# Patient Record
(demographics unavailable — no encounter records)

---

## 2024-12-12 NOTE — HISTORY OF PRESENT ILLNESS
[FreeTextEntry1] : Mr. BETANCUR is a 84 year old male referred by [facility] who presents for rib fractures following a fall on 11/27/24 as well as a spiculated nodule noted incidentally on the scans.  His past medical history includes squamous cell carcinoma of the tongue, type 2 diabetes mellitus, hypertension  CT Chest 11/27/24 at [facility]  - Lung nodule left upper lobe - Nondisplaced right 5th and 6th rib fractures  He was told following the Emergency Department visit to come here. He continues to have right rib pain both in the front and back. He also feels yesterday it was a little swollen. He has the oxycodone for the pain but hasn't been taking it due to dizziness and it doesn't help with the pain. He has trouble sleeping at night due to the pain. He was told that there may be another finding on the scan. He does have a little shortness of breath and coughing which is worse in the morning. He feels that his mouth is dry from the radiation.   The patient is accompanied by his wife, who assists with history taking.

## 2024-12-12 NOTE — DATA REVIEWED
[FreeTextEntry1] : Independent review of the following imaging and independent interpretation was performed at today's visit: - CT Chest 11/27 at [facility] Emergency Department  - Chest X-ray 11/27/24 at [facility] Emergency Department

## 2024-12-12 NOTE — DATA REVIEWED
[FreeTextEntry1] : Independent review of the following imaging and independent interpretation was performed at today's visit: - CT Chest 11/27 at [facility] Emergency Department  - Chest X-ray 11/27/24 at [facility] Emergency Department

## 2024-12-12 NOTE — DATA REVIEWED
[FreeTextEntry1] : Independent review of the following imaging and independent interpretation was performed at today's visit: - CT Chest 11/27 at [facility] Emergency Department  - Chest X-ray 11/27/24 at [facility] Emergency Department

## 2024-12-12 NOTE — ASSESSMENT
[FreeTextEntry1] : I had the pleasure of evaluating Mr. DIANE BETANCUR today. Briefly, this is a 84 year old male who has been found to have a 1.4 cm spiculated lung nodule, incidentally noted on recent CT scan of the chest. This is a new patient to my practice and I am glad for the opportunity to monitor the nodules to mitigate the risk for advancing disease.  In addition, Mr. Betancur was noted to have rib fractures following recent fall in a parking lot with loss of consciousness. He reports frequent weakness and dizziness. Today his blood pressure in 95/63 while on lisinopril and alpha blockers, which may have contributed to his symptoms and the syncopal episode. Patient encouraged to follow up with his primary care physician for re-evaluation of his medication.  The rib fractures were noted and are nondisplaced. He was encouraged to continue pain medication as needed and follow up for assurance of adequate healing.   After reviewing the imaging, it was also determined that there is a nodule present in the apex of the left lung. We discussed that nodules can be of infectious, inflammatory or malignant etiologies. When evaluating suspicious nodules, we look at certain characteristics of nodules such as size, consistency, shape and rate of growth. While this nodule is irregularly shaped, the risk of a malignancy is unclear as he has had PET scans and other CT scans in the past that have not definitely shown activity in that area.   Also taking into consideration Mr. Betancur's current pain issues while the rib fracture heals, his age and comorbid conditions, I believe that ongoing surveillance is appropriate at this time.    Moving forward our plan is to obtain a follow up surveillance CT chest in 3 months and at that time Mr. BETANCUR will return to care in my office to discuss the findings. All questions were answered, concerns were addressed and the patient expressed understanding and compliance with the followup plan.   Summary of plan: - CT chest in 3 months to evaluate both the lung nodule for growth and the rib fractures for signs of healing - Return to care after testing to review results   I, Dr. Orion Mir, personally performed the evaluation and management (E/M) services for this new patient.  That E/M includes conducting the initial examination, assessing all conditions, and establishing the plan of care.  Today, Tristin Turpin PA-C, was here to observe my evaluation and management services for this patient to be followed going forward.

## 2024-12-12 NOTE — REVIEW OF SYSTEMS
[Feeling Tired] : feeling tired [Cough] : cough [SOB on Exertion] : shortness of breath during exertion [Negative] : Heme/Lymph [Fever] : no fever [Chills] : no chills [FreeTextEntry8] : enlarged prostate

## 2024-12-12 NOTE — PHYSICAL EXAM
[General Appearance - Alert] : alert [General Appearance - In No Acute Distress] : in no acute distress [General Appearance - Well Nourished] : well nourished [General Appearance - Well Developed] : well developed [Sclera] : the sclera and conjunctiva were normal [Outer Ear] : the ears and nose were normal in appearance [Both Tympanic Membranes Were Examined] : both tympanic membranes were normal [Neck Appearance] : the appearance of the neck was normal [] : the neck was supple [Respiration, Rhythm And Depth] : normal respiratory rhythm and effort [Auscultation Breath Sounds / Voice Sounds] : lungs were clear to auscultation bilaterally [Heart Sounds] : normal S1 and S2 [Bowel Sounds] : normal bowel sounds [Cervical Lymph Nodes Enlarged Posterior Bilaterally] : posterior cervical [Cervical Lymph Nodes Enlarged Anterior Bilaterally] : anterior cervical [Supraclavicular Lymph Nodes Enlarged Bilaterally] : supraclavicular [Abnormal Walk] : normal gait [Skin Color & Pigmentation] : normal skin color and pigmentation [Skin Turgor] : normal skin turgor [No Focal Deficits] : no focal deficits [Oriented To Time, Place, And Person] : oriented to person, place, and time [Impaired Insight] : insight and judgment were intact [FreeTextEntry1] : right anterior rib below nipple with point tenderness. Upper right quadrant/lower chest anteriorly about 3 inches below broken rib area with swelling as well [FreeTextEntry2] : no edmea

## 2024-12-12 NOTE — HISTORY OF PRESENT ILLNESS
[FreeTextEntry1] : Mr. BETANCUR is a 84 year old male referred by [facility] who presents for rib fractures following a fall on 11/27/24 as well as a spiculated nodule noted incidentally on the scans.  His past medical history includes squamous cell carcinoma of the tongue, type 2 diabetes mellitus, hypertension  CT Chest 11/27/24 at [facility]  - Lung nodule left upper lobe - Nondisplaced right 5th and 6th rib fractures  He was told following the Emergency Department visit to come here. He continues to have right rib pain both in the front and back. He also feels yesterday it was a little swollen. He has the oxycodone for the pain but hasn't been taking it due to dizziness and it doesn't help with the pain. He has trouble sleeping at night due to the pain. He was told that there may be another finding on the scan. He does have a little shortness of breath and coughing which is worse in the morning. He feels that his mouth is dry from the radiation.   The patient is accompanied by his wife, who assists with history taking.

## 2024-12-12 NOTE — HISTORY OF PRESENT ILLNESS
[FreeTextEntry1] : Mr. BETANCUR is a 84 year old male referred by [facility] who presents for rib fractures following a fall on 11/27/24 as well as a spiculated nodule noted incidentally on the scans.  His past medical history includes squamous cell carcinoma of the tongue, type 2 diabetes mellitus, hypertension  CT Chest 11/27/24 at [facility]  - Lung nodule left upper lobe - Nondisplaced right 5th and 6th rib fractures  He was told following the Emergency Department visit to come here. He continues to have right rib pain both in the front and back. He also feels yesterday it was a little swollen. He has the oxycodone for the pain but hasn't been taking it due to dizziness and it doesn't help with the pain. He has trouble sleeping at night due to the pain. He was told that there may be another finding on the scan. He does have a little shortness of breath and coughing which is worse in the morning. He feels that his mouth is dry from the radiation.   The patient is accompanied by his wife, who assists with history taking.

## 2024-12-19 NOTE — RESULTS/DATA
[FreeTextEntry1] : 08/29/2024 - CT Neck Soft Tissue FINDINGS:  - Posttreatment changes are seen throughout the aerodigestive tract comprising of soft tissue swelling, edema, and loss of soft tissue fat planes. No masslike or nodular enhancement is seen in or about the oropharynx to suggest the presence of residual or recurrent tumor. - No new or enlarging cervical lymph nodes are seen in comparison with the prior neck CT study. - The salivary glands appear unremarkable apart from posttreatment changes. - The thyroid gland appears unremarkable. - Arterial vascular calcifications are seen. The neck vessels are patent. - No acute intracranial or orbital abnormalities are imaged. - The paranasal sinuses and tympanomastoid cavities are clear. - The patient is edentulous. The maxilla, mandible, and zygomatic arches are intact. The TMJ spaces appear within normal limits. - Mild multilevel cervical spondylosis is noted. There is an accessory right-sided C7 rib. - The imaged portions of the lungs are clear. Please refer to the report for the concurrent dedicated chest CT for findings regarding the thoracic structures. IMPRESSION:  - Stable interval follow-up CT examination without evidence of neoplastic disease progression nor recurrence. No new or enlarging cervical lymph nodes.

## 2024-12-19 NOTE — PHYSICAL EXAM
[Restricted in physically strenuous activity but ambulatory and able to carry out work of a light or sedentary nature] : Status 1- Restricted in physically strenuous activity but ambulatory and able to carry out work of a light or sedentary nature, e.g., light house work, office work [Normal] : affect appropriate [de-identified] : trach in place

## 2024-12-19 NOTE — HISTORY OF PRESENT ILLNESS
[de-identified] : 83 yo M with h/o HTN, DM2 and HLD who was diagnosed with SCC of the L BOT in May 2023.  He presented to ENT with dysphagia.  CT showed a 2.5 x 2.4 left cervical LN.  ENT exam showed a exophytic base of tongue mass extending along the left pharyngeal wall extending down to his vallecula crossing the midline and pushing his epiglottis.  Biopsies of both the LN and BOT mass were c/w SCC. p16+. Stage I, HPV SCC of tongue.   He completed CRT with cetuximab on 7/31/23. He comes today to transfer medical oncology care to [place], where his daughter lives.  He feels well overall with improving PO intake. can eat soft foods now, but no burgers, steaks, or hot dogs as yet. Denies any fever, chills, chest pain, SOB, nausea, vomiting, diarrhea, consitpation, dysuria, or hematuria.  11/09/23: Mr Mann returns for follow up. His PET scan was negative for recurrence/residual disease though official report is being faxed over for my verification. He will continue to follow up with ENT and scan will be under that perview. Follow up in 3 months. Currently BHUMI  02/15/24: Mr Mann returns for follow up. He is doing ok and saw Dr. Benji Roberts in [place] who performed layrngoscopy which was negative. Weight is down a little.  Will order CT neck and chest for end of March and follow up in 2 months to discuss the results  04/18/24: Mr Mann returns for follow up. CT neck from 03/29/24 shows posttreatment changes. No evidence of residual or recurrent left base of tongue neoplasm. No cervical lymphadenopathy.  Doing well overall, but still has difficulty swallowing solids. No issues with liquids. Will get feeding tube removed. FOllow up in 3 months  07/19/24: Mr. Mann returns for follow up. States he is feeling "lousy", endorsing he has not improved in any symptoms. He notes weight loss of approximately 20 lbs. since April 2024, due to difficulties with swallowing. States even when consuming thick liquids or soft solid foods (eggs, oatmeal, etc.) gets stuck in his throat, and requires water to push it down. Denies decreased appetite. Recently followed with ENT, who noted there is nothing they can do to help. He received a referral for another ENT in [place] for evaluation of esophageal dilation. Scheduled evaluation for some time in August 2024. Patient complains of a constant lump and visible bulge in his throat.  Additionally, patient complains that for the past 2-3 weeks, there are boils inside his lip. The boils come and go but notes there is a big one at time of visit.  Patient's spouse endorses a new onset unsteadiness when walking and standing up. Over the past few weeks his wife reports the patient has fallen on 3 occasions. Patient complains that he is a bit dazed. He endorses drinking plenty of water, especially because his throat dries out quickly, and does not believe dehydration is the cause. Currently on Lisinopril 5 MG 1x day.   [de-identified] : 09/17/2024: Mr. Mann presents for follow up. He states he recently followed up with ENT ([facility]), and he underwent esophageal dilation. Reports that they were able to dilate the esophagus slowly, however he was ultimately referred to GI for further dilation. He is still complaining of difficulty with swallowing / dysphagia. States that when he is speaking he experiences significant dryness of mouth, which exacerbates dysphagia and makes it hard to speak/swalllow. Inquired about imaging.   12/19/24: Patient presents for surveillance follow up for a p16+ Stage I, HPV SCC of the L BOT s/p CRT with cetuximab, completed on 7/31/23 + Dysphagia, improving but still needs liquid to help get food down. + Xerostomia. + Dysgeusia, describes as "no taste at all".  Denies any recent fevers or chills

## 2024-12-19 NOTE — ASSESSMENT
[Palliative Care Plan] : not applicable at this time [FreeTextEntry1] : DIANE BETANCUR is a 83 year old male presenting to the clinic today with His spouse. The patient is being evaluated for a Stage I, HPV+ SCC of the L BOT s/p CRT with cetuximab on -7/31/23    -Squmous cell carcinoma BOT: stage I HPV driven disease s/p CRT w cetux, doing well clinically. Will order PET scan for end of October to asses response to treatment. He will follow up to discuss the results.   11/09/23: Mr Betancur returns for follow up. His PET scan was negative for recurrence/residual disease though official report is being faxed over for my verification. He will continue to follow up with ENT and scan will be under that perview. Follow up in 3 months. Currently BHUMI  02/15/24: Mr Betancur returns for follow up. He is doing ok and saw Dr. Benji Roberts in [place] who performed layrngoscopy which was negative. Weight is down a little.  Will order CT neck and chest for end of March and follow up in 2 months to discuss the results  04/18/24: Mr Betancur returns for follow up. CT neck from 03/29/24 shows posttreatment changes. No evidence of residual or recurrent left base of tongue neoplasm. No cervical lymphadenopathy.  Doing well overall, but still has difficulty swallowing solids. No issues with liquids. Will get feeding tube removed. Follow up in 3 months  07/19/2024: Mr. Betancur presents for follow up. We reviewed the patient's symptoms in depth and discussed that the new onset dizziness and unsteadiness when standing are likely attributed to orthostatic hypotension. Advised patient to follow up with PCP and/or cardio for adjustment of Lisinopril dosage. Encouraged patient to follow with new ENT for possible proximal esophageal dilation to assist with patient's difficulty swallowing. Will obtain repeat CT of Neck/Head/Chest. Patient will RTO in September to review results.  09/17/2024: Mr. Betancur presents for follow up. Currently BHUMI. We reviewed the patient's symptoms in depth and discussed benefits of sooner follow up with GI. Will obtain repeat CT of Neck/Head/Chest. Patient will RTO in 3 months to review results.  12/19/24 Presents for surveillance follow up for a p16+ Stage I, HPV SCC of the L BOT s/p CRT with cetuximab, completed on 7/31/23. still dealing with significant post treatment sequela including dysphagia and severe dysgeusia.  EGD scheduled for next week, may need esophageal stent. Dr. Orion Mir following lung nodules, repeat CT in late February with Dr. Mir follow up on 3/13/24 Dr. Emanuel follow up in late March for routine surveillance or possibly to resume treatment pending further evaluation of lung nodules

## 2024-12-19 NOTE — REVIEW OF SYSTEMS
[Recent Change In Weight] : ~T recent weight change [Dysphagia] : dysphagia [Confused] : confusion [Dizziness] : dizziness [Fever] : no fever [Fatigue] : no fatigue [Vision Problems] : no vision problems [Hoarseness] : no hoarseness [Chest Pain] : no chest pain [Lower Ext Edema] : no lower extremity edema [Shortness Of Breath] : no shortness of breath [SOB on Exertion] : no shortness of breath during exertion [Abdominal Pain] : no abdominal pain [Vomiting] : no vomiting [Dysuria] : no dysuria [Joint Pain] : no joint pain [Easy Bleeding] : no tendency for easy bleeding [Easy Bruising] : no tendency for easy bruising [Swollen Glands] : no swollen glands [FreeTextEntry2] : unintentional weight loss 20+ lbs. [FreeTextEntry4] : inability to swallow solid foods. Bulge in throat

## 2025-03-04 NOTE — DATA REVIEWED
[FreeTextEntry1] : Independent review of imaging and independent interpretation was performed at today's visit. -2/26/25 CT Chest non contrast through [facility]

## 2025-03-04 NOTE — HISTORY OF PRESENT ILLNESS
[FreeTextEntry1] : Mr. BETANCUR is an 84-year-old male who presents for follow up visit regarding a lung nodule and rib fractures. At our last visit in December 2024, it was recommended that he obtain a CT Chest in 3 months which he presents today to review.   His past medical history includes squamous cell carcinoma of the tongue, type 2 diabetes mellitus, hypertension  11/27/24 CT Chest at [facility] - Stable spiculated density in the leg lung apex measuring 1.4cm, no significant change since prior CT Chest 8/29/24 from 1.2cm. - Nondisplaced right 5th and 6th rib fractures  2/26/25 CT Chest non contrast through [facility] -Mild bilateral upper lobe subpleural scarring/reticulations, likely related to post radiation change -Again, noted are 2 adjacent peribronchial vascular irregular solid nodules measuring 1.1 cm and 0.9 cm. These are without significant change from 8/29/2024. -Several additional pulmonary nodules are unchanged. 5mm nodule opacity left upper lobe apex, 2mm subpleural right apical nodule  1-2mm nodule left upper lobe and 2mm nodule superior segment left lower lobe.  -Healed/healing nondisplaced fractures of the right fourth through sixth posterolateral ribs

## 2025-03-04 NOTE — ASSESSMENT
[FreeTextEntry1] : Mr. BETANCUR is an 84-year-old male who presents for follow up visit regarding a lung nodule and rib fractures. At our last visit in December 2024, it was recommended that he obtain a CT Chest in 3 months which he presents today to review.   His past medical history includes squamous cell carcinoma of the tongue, type 2 diabetes mellitus, hypertension  2/26/25 CT Chest non contrast through [facility] -Mild bilateral upper lobe subpleural scarring/reticulations, likely related to post radiation change -Again, noted are 2 adjacent peribronchial vascular irregular solid nodules measuring 1.1 cm and 0.9 cm. These are without significant change from 8/29/2024. -Several additional pulmonary nodules are unchanged. 5mm nodule opacity left upper lobe apex, 2mm subpleural right apical nodule  1-2mm nodule left upper lobe and 2mm nodule superior segment left lower lobe.  -Healed/healing nondisplaced fractures of the right fourth through sixth posterolateral ribs  I have reviewed the patient's medical records and diagnostic images at time of this consultation and have made the following recommendations: 1. 2.  All questions answered, patient verbalizes understanding and agrees to follow up accordingly.

## 2025-03-13 NOTE — DATA REVIEWED
[FreeTextEntry1] : Independent review of imaging and independent interpretation was performed at today's visit. -2/26/25 CT Chest non contrast through [facility]

## 2025-03-13 NOTE — HISTORY OF PRESENT ILLNESS
[FreeTextEntry1] : Mr. BETANCUR is an 84-year-old male who presents for follow up visit regarding a lung nodule and rib fractures. At our last visit in December 2024, it was recommended that he obtain a CT Chest in 3 months which he presents today to review.   His past medical history includes squamous cell carcinoma of the tongue, type 2 diabetes mellitus, hypertension  11/27/24 CT Chest at [facility] - Stable spiculated density in the leg lung apex measuring 1.4cm, no significant change since prior CT Chest 8/29/24 from 1.2cm. - Nondisplaced right 5th and 6th rib fractures  2/26/25 CT Chest non contrast through [facility] -Mild bilateral upper lobe subpleural scarring/reticulations, likely related to post radiation change -Again, noted are 2 adjacent peribronchial vascular irregular solid nodules measuring 1.1 cm and 0.9 cm. These are without significant change from 8/29/2024. -Several additional pulmonary nodules are unchanged. 5mm nodule opacity left upper lobe apex, 2mm subpleural right apical nodule,1-2mm nodule left upper lobe and 2mm nodule superior segment left lower lobe.  -Healed/healing nondisplaced fractures of the right fourth through sixth posterolateral ribs  Today, he is accompanied by his wife who assists in the history taking. He does report shortness of breath on exertion, chronic nonproductive cough. He reports he is still experiencing discomfort on the right side where his initial rib fractures occurred. He states he also experiences this discomfort when he takes a deep breath.  Patient denies chest pain, palpitations, fevers, chills, fatigue, unintentional weight loss or gain, and night sweats.

## 2025-03-13 NOTE — DATA REVIEWED
[FreeTextEntry1] : Independent review of imaging and independent interpretation was performed at today's visit. -2/26/25 CT Chest non contrast through [facility]

## 2025-03-13 NOTE — PHYSICAL EXAM
[Sclera] : the sclera and conjunctiva were normal [Neck Appearance] : the appearance of the neck was normal [Respiration, Rhythm And Depth] : normal respiratory rhythm and effort [Auscultation Breath Sounds / Voice Sounds] : lungs were clear to auscultation bilaterally [Heart Rate And Rhythm] : heart rate was normal and rhythm regular [Heart Sounds] : normal S1 and S2 [Examination Of The Chest] : the chest was normal in appearance [No Focal Deficits] : no focal deficits [Skin Color & Pigmentation] : normal skin color and pigmentation [Oriented To Time, Place, And Person] : oriented to person, place, and time [Impaired Insight] : insight and judgment were intact [Affect] : the affect was normal [Mood] : the mood was normal [FreeTextEntry1] : ambulates with cane

## 2025-03-13 NOTE — PHYSICAL EXAM
[Sclera] : the sclera and conjunctiva were normal [Neck Appearance] : the appearance of the neck was normal [Respiration, Rhythm And Depth] : normal respiratory rhythm and effort [Auscultation Breath Sounds / Voice Sounds] : lungs were clear to auscultation bilaterally [Heart Rate And Rhythm] : heart rate was normal and rhythm regular [Heart Sounds] : normal S1 and S2 [Examination Of The Chest] : the chest was normal in appearance [Skin Color & Pigmentation] : normal skin color and pigmentation [No Focal Deficits] : no focal deficits [Oriented To Time, Place, And Person] : oriented to person, place, and time [Impaired Insight] : insight and judgment were intact [Affect] : the affect was normal [Mood] : the mood was normal [FreeTextEntry1] : ambulates with cane

## 2025-03-13 NOTE — REVIEW OF SYSTEMS
[Cough] : cough [SOB on Exertion] : shortness of breath during exertion [Negative] : Heme/Lymph [Feeling Poorly] : not feeling poorly [Feeling Tired] : not feeling tired [Chest Pain] : no chest pain [Palpitations] : no palpitations [Lower Ext Edema] : no extremity edema [Shortness Of Breath] : no shortness of breath [Wheezing] : no wheezing

## 2025-03-13 NOTE — HISTORY OF PRESENT ILLNESS
[FreeTextEntry1] : Mr. BETANCUR is an 84-year-old male who presents for follow up visit regarding a lung nodule and rib fractures. At our last visit in December 2024, it was recommended that he obtain a CT Chest in 3 months which he presents today to review.   His past medical history includes squamous cell carcinoma of the tongue, type 2 diabetes mellitus, hypertension  11/27/24 CT Chest at [facility] - Stable spiculated density in the leg lung apex measuring 1.4cm, no significant change since prior CT Chest 8/29/24 from 1.2cm. - Nondisplaced right 5th and 6th rib fractures  2/26/25 CT Chest non contrast through [facility] -Mild bilateral upper lobe subpleural scarring/reticulations, likely related to post radiation change -Again, noted are 2 adjacent peribronchial vascular irregular solid nodules measuring 1.1 cm and 0.9 cm. These are without significant change from 8/29/2024. -Several additional pulmonary nodules are unchanged. 5mm nodule opacity left upper lobe apex, 2mm subpleural right apical nodule,1-2mm nodule left upper lobe and 2mm nodule superior segment left lower lobe.  -Healed/healing nondisplaced fractures of the right fourth through sixth posterolateral ribs  Today, he is accompanied by his wife who assists in the history taking. He does report shortness of breath on exertion, chronic nonproductive cough. He reports he is still experiencing discomfort on the right side where his initial rib fractures occurred. He states he also experiences this discomfort when he takes a deep breath.  Patient denies chest pain, palpitations, fevers, chills, fatigue, unintentional weight loss or gain, and night sweats.

## 2025-03-13 NOTE — ASSESSMENT
[FreeTextEntry1] : Mr. BETANCUR is an 84-year-old male who presents for follow up visit regarding a lung nodule and rib fractures. At our last visit in December 2024, it was recommended that he obtain a CT Chest in 3 months which he presents today to review.   His past medical history includes squamous cell carcinoma of the tongue, type 2 diabetes mellitus, hypertension  2/26/25 CT Chest non contrasts through [facility] -Mild bilateral upper lobe subpleural scarring/reticulations, likely related to post radiation change -Again, noted are 2 adjacent peribronchial vascular irregular solid nodules measuring 1.1 cm and 0.9 cm. These are without significant change from 8/29/2024. -Several additional pulmonary nodules are unchanged. 5mm nodule opacity left upper lobe apex, 2mm subpleural right apical nodule  1-2mm nodule left upper lobe and 2mm nodule superior segment left lower lobe.  -Healed/healing nondisplaced fractures of the right fourth through sixth posterolateral ribs  Upon review of the CT imaging from 2/26/25, there has been no significant growth in left upper lobe nodules. The right rib fractures appear to be healed which likely could explain the noted focal sclerosis on the CT report.   I have reviewed the patient's medical records and diagnostic images at time of this consultation and have made the following recommendations: 1.  Surveillance CT imaging in 6 months  2.  Return to care in 6 months or sooner if needed  All questions answered, patient verbalizes understanding and agrees to follow up accordingly.  I, Dr. Mir personally performed the evaluation and management (E/M) services for this established patient who presents today with an existing condition(s). That E/M includes conducting the examination, assessing all new/exacerbated conditions, and establishing a new plan of care. Today, my ACP, Suki Sr NP, was here to observe my evaluation and management services for this existing condition to be followed going forward.

## 2025-03-13 NOTE — ASSESSMENT
[FreeTextEntry1] : Mr. BETANCUR is an 84-year-old male who presents for follow up visit regarding a lung nodule and rib fractures. At our last visit in December 2024, it was recommended that he obtain a CT Chest in 3 months which he presents today to review.   His past medical history includes squamous cell carcinoma of the tongue, type 2 diabetes mellitus, hypertension  2/26/25 CT Chest non contrasts through [facility] -Mild bilateral upper lobe subpleural scarring/reticulations, likely related to post radiation change -Again, noted are 2 adjacent peribronchial vascular irregular solid nodules measuring 1.1 cm and 0.9 cm. These are without significant change from 8/29/2024. -Several additional pulmonary nodules are unchanged. 5mm nodule opacity left upper lobe apex, 2mm subpleural right apical nodule  1-2mm nodule left upper lobe and 2mm nodule superior segment left lower lobe.  -Healed/healing nondisplaced fractures of the right fourth through sixth posterolateral ribs  Upon review of the CT imaging from 2/26/25, there has been no significant growth in left upper lobe nodules. The right rib fractures appear to be healed which likely could explain the noted focal sclerosis on the CT report.   I have reviewed the patient's medical records and diagnostic images at time of this consultation and have made the following recommendations: 1.  Surveillance CT imaging in 6 months  2.  Return to care in 6 months or sooner if needed  All questions answered, patient verbalizes understanding and agrees to follow up accordingly.  I, Dr. Mir personally performed the evaluation and management (E/M) services for this established patient who presents today with an existing condition(s). That E/M includes conducting the examination, assessing all new/exacerbated conditions, and establishing a new plan of care. Today, my ACP, Suki Sr NP, was here to observe my evaluation and management services for this existing condition to be followed going forward.

## 2025-03-25 NOTE — HISTORY OF PRESENT ILLNESS
[de-identified] : 83 yo M with h/o HTN, DM2 and HLD who was diagnosed with SCC of the L BOT in May 2023.  He presented to ENT with dysphagia.  CT showed a 2.5 x 2.4 left cervical LN.  ENT exam showed a exophytic base of tongue mass extending along the left pharyngeal wall extending down to his vallecula crossing the midline and pushing his epiglottis.  Biopsies of both the LN and BOT mass were c/w SCC. p16+. Stage I, HPV SCC of tongue.   He completed CRT with cetuximab on 7/31/23. He comes today to transfer medical oncology care to [place], where his daughter lives.  He feels well overall with improving PO intake. can eat soft foods now, but no burgers, steaks, or hot dogs as yet. Denies any fever, chills, chest pain, SOB, nausea, vomiting, diarrhea, consitpation, dysuria, or hematuria.  11/09/23: Mr Mann returns for follow up. His PET scan was negative for recurrence/residual disease though official report is being faxed over for my verification. He will continue to follow up with ENT and scan will be under that perview. Follow up in 3 months. Currently [facility]I  02/15/24: Mr Mann returns for follow up. He is doing ok and saw Dr. Benji Robetrs in [place] who performed layrngoscopy which was negative. Weight is down a little.  Will order CT neck and chest for end of March and follow up in 2 months to discuss the results  04/18/24: Mr Mann returns for follow up. CT neck from 03/29/24 shows posttreatment changes. No evidence of residual or recurrent left base of tongue neoplasm. No cervical lymphadenopathy.  Doing well overall, but still has difficulty swallowing solids. No issues with liquids. Will get feeding tube removed. FOllow up in 3 months  07/19/24: Mr. Mann returns for follow up. States he is feeling "lousy", endorsing he has not improved in any symptoms. He notes weight loss of approximately 20 lbs. since April 2024, due to difficulties with swallowing. States even when consuming thick liquids or soft solid foods (eggs, oatmeal, etc.) gets stuck in his throat, and requires water to push it down. Denies decreased appetite. Recently followed with ENT, who noted there is nothing they can do to help. He received a referral for another ENT in [place] for evaluation of esophageal dilation. Scheduled evaluation for some time in August 2024. Patient complains of a constant lump and visible bulge in his throat.  Additionally, patient complains that for the past 2-3 weeks, there are boils inside his lip. The boils come and go but notes there is a big one at time of visit.  Patient's spouse endorses a new onset unsteadiness when walking and standing up. Over the past few weeks his wife reports the patient has fallen on 3 occasions. Patient complains that he is a bit dazed. He endorses drinking plenty of water, especially because his throat dries out quickly, and does not believe dehydration is the cause. Currently on Lisinopril 5 MG 1x day.   [de-identified] : 09/17/2024: Mr. Mann presents for follow up. He states he recently followed up with ENT ([facility]), and he underwent esophageal dilation. Reports that they were able to dilate the esophagus slowly, however he was ultimately referred to GI for further dilation. He is still complaining of difficulty with swallowing / dysphagia. States that when he is speaking he experiences significant dryness of mouth, which exacerbates dysphagia and makes it hard to speak/swalllow. Inquired about imaging.   12/19/24: Patient presents for surveillance follow up for a p16+ Stage I, HPV SCC of the L BOT s/p CRT with cetuximab, completed on 7/31/23 + Dysphagia, improving but still needs liquid to help get food down. + Xerostomia. + Dysgeusia, describes as "no taste at all".  Denies any recent fevers or chills   03/25/25: Mr. Mann returns for follow up, accompanied by wife. Complaining of continued dysphagia and difficulty eating. Denies improvement. Evaluated by Dr. Mir who reviewed CT from 02/26/2025 which showed stability of the nodules. During the encounter with Dr. Mir the patient complained of dizziness. His wife reports that Mr. Mann fell and fractured 3 ribs as a result of the fall. Was referred to ENT by Dr. Mir. ENT work up unremarkable. Patient referred to neurology. Patient reported known history of brain growth but admits he has not followed up regarding this for many years.

## 2025-03-25 NOTE — PHYSICAL EXAM
[Restricted in physically strenuous activity but ambulatory and able to carry out work of a light or sedentary nature] : Status 1- Restricted in physically strenuous activity but ambulatory and able to carry out work of a light or sedentary nature, e.g., light house work, office work [Normal] : affect appropriate [de-identified] : trach in place

## 2025-03-25 NOTE — REVIEW OF SYSTEMS
[Dysphagia] : dysphagia [Confused] : confusion [Dizziness] : dizziness [Fatigue] : fatigue [Odynophagia] : odynophagia [Fever] : no fever [Vision Problems] : no vision problems [Hoarseness] : no hoarseness [Chest Pain] : no chest pain [Lower Ext Edema] : no lower extremity edema [Shortness Of Breath] : no shortness of breath [SOB on Exertion] : no shortness of breath during exertion [Abdominal Pain] : no abdominal pain [Vomiting] : no vomiting [Dysuria] : no dysuria [Joint Pain] : no joint pain [Easy Bleeding] : no tendency for easy bleeding [Easy Bruising] : no tendency for easy bruising [Swollen Glands] : no swollen glands [FreeTextEntry4] : inability to swallow solid foods. Bulge in throat [de-identified] : +dizziness w/ fall; now ambulating with 3-point cane

## 2025-03-25 NOTE — ASSESSMENT
[Palliative Care Plan] : not applicable at this time [FreeTextEntry1] : DIANE BETANCUR is a 83 year old male presenting to the clinic today with His spouse. The patient is being evaluated for a Stage I, HPV+ SCC of the L BOT s/p CRT with cetuximab on -7/31/23  -Squmous cell carcinoma BOT: stage I HPV driven disease s/p CRT w cetux, doing well clinically. Will order PET scan for end of October to asses response to treatment. He will follow up to discuss the results.   11/09/23: Mr Betancur returns for follow up. His PET scan was negative for recurrence/residual disease though official report is being faxed over for my verification. He will continue to follow up with ENT and scan will be under that perview. Follow up in 3 months. Currently [facility]  02/15/24: Mr Betancur returns for follow up. He is doing ok and saw Dr. Benji Roberts in [place] who performed layrngoscopy which was negative. Weight is down a little.  Will order CT neck and chest for end of March and follow up in 2 months to discuss the results  04/18/24: Mr Betancur returns for follow up. CT neck from 03/29/24 shows posttreatment changes. No evidence of residual or recurrent left base of tongue neoplasm. No cervical lymphadenopathy.  Doing well overall, but still has difficulty swallowing solids. No issues with liquids. Will get feeding tube removed. Follow up in 3 months  07/19/2024: Mr. Betancur presents for follow up. We reviewed the patient's symptoms in depth and discussed that the new onset dizziness and unsteadiness when standing are likely attributed to orthostatic hypotension. Advised patient to follow up with PCP and/or cardio for adjustment of Lisinopril dosage. Encouraged patient to follow with new ENT for possible proximal esophageal dilation to assist with patient's difficulty swallowing. Will obtain repeat CT of Neck/Head/Chest. Patient will RTO in September to review results.  09/17/2024: Mr. Betancur presents for follow up. Currently [facility]. We reviewed the patient's symptoms in depth and discussed benefits of sooner follow up with GI. Will obtain repeat CT of Neck/Head/Chest. Patient will RTO in 3 months to review results.  12/19/24 Presents for surveillance follow up for a p16+ Stage I, HPV SCC of the L BOT s/p CRT with cetuximab, completed on 7/31/23. still dealing with significant post treatment sequela including dysphagia and severe dysgeusia.  EGD scheduled for next week, may need esophageal stent. Dr. Orion Mir following lung nodules, repeat CT in late February with Dr. Mir follow up on 3/13/24 Dr. Emanuel follow up in late March for routine surveillance or possibly to resume treatment pending further evaluation of lung nodules  03/25/25: Mr. Betancur returns for follow up, accompanied by wife. Still complaining of dysphagia/odynophagia, now with dizziness and fall resulting in 3 rib fracture. Recently evaluated by Dr Mir. Recommended evaluation by neurology for dizziness.  ENT for dysphagia.   Will plan for repeat imaging of the neck in August.  Currently radiographically and clinically [facility]UMI

## 2025-06-25 NOTE — PHYSICAL EXAM
[General Appearance - Alert] : alert [General Appearance - In No Acute Distress] : in no acute distress [General Appearance - Well-Appearing] : healthy appearing [Oriented To Time, Place, And Person] : oriented to person, place, and time [Impaired Insight] : insight and judgment were intact [Affect] : the affect was normal [Memory Recent] : recent memory was not impaired [Person] : oriented to person [Place] : oriented to place [Time] : oriented to time [Concentration Intact] : normal concentrating ability [Visual Intact] : visual attention was ~T not ~L decreased [Fluency] : fluency intact [Comprehension] : comprehension intact [Past History] : adequate knowledge of personal past history [Cranial Nerves Optic (II)] : visual acuity intact bilaterally,  visual fields full to confrontation, pupils equal round and reactive to light [Cranial Nerves Oculomotor (III)] : extraocular motion intact [Cranial Nerves Trigeminal (V)] : facial sensation intact symmetrically [Cranial Nerves Facial (VII)] : face symmetrical [Cranial Nerves Vestibulocochlear (VIII)] : hearing was intact bilaterally [Cranial Nerves Glossopharyngeal (IX)] : tongue and palate midline [Cranial Nerves Accessory (XI - Cranial And Spinal)] : head turning and shoulder shrug symmetric [Cranial Nerves Hypoglossal (XII)] : there was no tongue deviation with protrusion [Motor Tone] : muscle tone was normal in all four extremities [Motor Strength] : muscle strength was normal in all four extremities [No Muscle Atrophy] : normal bulk in all four extremities [Paresis Pronator Drift Right-Sided] : no pronator drift on the right [Paresis Pronator Drift Left-Sided] : no pronator drift on the left [Sensation Tactile Decrease] : light touch was intact [Sensation Pain / Temperature Decrease] : pain and temperature was intact [Romberg's Sign] : Romberg's sign was negtive [Abnormal Walk] : normal gait [Balance] : balance was intact [Past-pointing] : there was no past-pointing [Tremor] : no tremor present [Coordination - Dysmetria Impaired Finger-to-Nose Bilateral] : not present [2+] : Patella left 2+ [PERRL With Normal Accommodation] : pupils were equal in size, round, reactive to light, with normal accommodation [Extraocular Movements] : extraocular movements were intact [Full Visual Field] : full visual field [FreeTextEntry1] : No nystagmus, no ptosis.

## 2025-06-25 NOTE — ASSESSMENT
[FreeTextEntry1] : Complains of foggy blurred vision as discussed above His neurological examination is entirely normal Evaluations by ENT and ophthalmology have been unremarkable Was told 8 years ago based on a brain MRI that he had some sort of growth in his head that should be followed and he has never gone for any sort of follow-up We will get an updated brain MRI with further discussions to follow

## 2025-06-25 NOTE — HISTORY OF PRESENT ILLNESS
[FreeTextEntry1] : This 84-year-old man was seen in neurological consultation today He reports visual disturbance going on for about the last 6 months He says his vision is foggy and blurry and that this is constant Says he has had ENT and ophthalmology evaluations and nothing significant was found Denies any diplopia or ptosis Denies any weakness of the arms or legs Able to drive without difficulty Gets some mild infrequent headaches  Medical history otherwise significant for hypertension, coronary artery disease, tongue cancer diagnosed in 2023  Former smoker, no alcohol use  He does relate that he had a brain MRI 8 years ago while living in [place] and he does not remember why he had it He says he was told he had some sort of growth in the head and should get follow-ups but he never did He does not have any records available